# Patient Record
Sex: MALE | Race: WHITE | NOT HISPANIC OR LATINO | Employment: FULL TIME | URBAN - NONMETROPOLITAN AREA
[De-identification: names, ages, dates, MRNs, and addresses within clinical notes are randomized per-mention and may not be internally consistent; named-entity substitution may affect disease eponyms.]

---
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
## 2024-05-07 ENCOUNTER — TELEPHONE (OUTPATIENT)
Dept: INTERNAL MEDICINE | Age: 19
End: 2024-05-07

## 2024-05-07 ENCOUNTER — TELEPHONE (OUTPATIENT)
Dept: INTERNAL MEDICINE | Age: 19
End: 2024-05-07
Payer: COMMERCIAL

## 2024-05-07 NOTE — TELEPHONE ENCOUNTER
Caller: Amanuel Durbin FOLLOWING UP ON PATIENT MESSAGE FROM 5/7/2024    Relationship: Self    Best call back number: [phone]     What was the call regarding: PREVIOUSLY RECEIVED A REFERRAL FROM [facility] FOR BEHAVIORAL HEALTH.    PATIENT HAD TO STOP BEHAVIORAL HEALTH  VISITS FOR A WHILE DUE TO FINANCIAL ISSUES.    HE WOULD LIKE TO START SEEING BEHAVIORAL HEALTH AGAIN.    REQUESTING TO BE REMINDED OF PROVIDER'S NAME AND CONTACT INFORMATION. (ADDED IF [facility] WANTED TO SEND HIM SOMEWHERE ELSE HE WOULD AGREE)